# Patient Record
Sex: FEMALE
[De-identification: names, ages, dates, MRNs, and addresses within clinical notes are randomized per-mention and may not be internally consistent; named-entity substitution may affect disease eponyms.]

---

## 2018-05-21 ENCOUNTER — HOSPITAL ENCOUNTER (EMERGENCY)
Dept: HOSPITAL 42 - ED | Age: 73
Discharge: HOME | End: 2018-05-21
Payer: MEDICARE

## 2018-05-21 VITALS — TEMPERATURE: 98.1 F | RESPIRATION RATE: 18 BRPM

## 2018-05-21 VITALS — HEART RATE: 85 BPM | OXYGEN SATURATION: 100 % | DIASTOLIC BLOOD PRESSURE: 82 MMHG | SYSTOLIC BLOOD PRESSURE: 120 MMHG

## 2018-05-21 VITALS — BODY MASS INDEX: 24.1 KG/M2

## 2018-05-21 DIAGNOSIS — M25.562: ICD-10-CM

## 2018-05-21 DIAGNOSIS — Z23: ICD-10-CM

## 2018-05-21 DIAGNOSIS — S80.02XA: Primary | ICD-10-CM
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
## 2018-05-21 DIAGNOSIS — M79.89: ICD-10-CM
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
## 2018-05-21 PROCEDURE — 96372 THER/PROPH/DIAG INJ SC/IM: CPT

## 2018-05-21 PROCEDURE — 90471 IMMUNIZATION ADMIN: CPT

## 2018-05-21 PROCEDURE — 90715 TDAP VACCINE 7 YRS/> IM: CPT

## 2018-05-21 PROCEDURE — 99283 EMERGENCY DEPT VISIT LOW MDM: CPT

## 2018-05-21 PROCEDURE — 73560 X-RAY EXAM OF KNEE 1 OR 2: CPT

## 2018-05-21 NOTE — ED PDOC
Arrival/HPI





- General


Chief Complaint: Lower Extremity Problem/Injury


Time Seen by Provider: 05/21/18 18:37


Historian: Patient





- History of Present Illness


Narrative History of Present Illness (Text): 





05/21/18 18:49


pt p/w + left knee pain s/p trip and fall 1 day ago after she showered, pt 

states she landed on both knees but her left knee pain was much worse today; pt 

was able to stand and ambulate with limping/favoring of left leg; pt denied 

head injury/neck pain, no fever/chills/sweats, no cp/sob/palpitations, no abd 

pain, no n/v, no numbness/tingling, no urinary/bowel changes, no sick contact, 

no travel; pt states her pain is severe, and at home OTC medication (advil) is 

not helping; pt denied other complaints


pt is here for further eval


pt's without other complaints.








PCP: Dr Marin








Time/Duration: 24 hours


Symptom Onset: Sudden


Symptom Course: Unchanged


Quality: Throbbing


Severity Level: Severe


Activities at Onset: Other (ambulation)


Context: Walking, Home





Past Medical History





- Provider Review


Nursing Documentation Reviewed: Yes





- Travel History


Have you recently traveled outside [place] w/in the past 3 mons?: No





- Past History


Past History: No Previous





- Infectious Disease


Hx of Infectious Diseases: None





- Tetanus Immunization


Tetanus Immunization: Unknown





- Reproductive


Menopause: Yes


Currently Pregnant: No





- Cardiac


Hx Cardiac Disorders: No





- Pulmonary


Hx Respiratory Disorders: No





- Neurological


Hx Neurological Disorder: No





- HEENT


Hx HEENT Disorder: Yes (uses glasses)





- Renal


Hx Renal Disorder: No





- Endocrine/Metabolic


Hx Hypothyroidism: Yes





- Hematological/Oncological


Hx Blood Disorders: No





- Integumentary


Hx Dermatological Disorder: No





- Musculoskeletal/Rheumatological


Hx Falls: No





- Gastrointestinal


Hx Gastrointestinal Disorders: No





- Genitourinary/Gynecological


Hx Genitourinary Disorders: No





- Psychiatric


Hx Substance Use: No





- Surgical History


Hx Hysterectomy: Yes





- Suicidal Assessment


Feels Threatened In Home Enviroment: No





Family/Social History





- Physician Review


Nursing Documentation Reviewed: Yes


Family/Social History: No Known Family HX


Smoking Status: Former Smoker


Hx Alcohol Use: No


Hx Substance Use: No


Hx Substance Use Treatment: No





Allergies/Home Meds


Allergies/Adverse Reactions: 


Allergies





acetaminophen Allergy (Verified 05/21/18 19:06)


 HEADACHE








Home Medications: 


 Home Meds











 Medication  Instructions  Recorded  Confirmed


 


Levothyroxine Sodium 0.088 mg PO DAILY 01/08/14 05/21/18














Review of Systems





- Review of Systems


Constitutional: Normal


Eyes: Normal


ENT: Normal


Respiratory: Normal


Cardiovascular: Normal


Gastrointestinal: Normal


Genitourinary Female: Normal


Musculoskeletal: Other (left knee pain/swelling)


Skin: Normal


Neurological: Normal


Endocrine: Normal


Hemo/Lymphatic: Normal


Psychiatric: Normal





Physical Exam





- Physical Exam


Narrative Physical Exam (Text): 





05/21/18 18:51


General: alert/awake, GCS = 15, oriented x 3, sitting on bed, uncomfortable, 

cooperative, interactive; NAD


Head: NC/AT


EYE: PERRLA, EOMI, sclera anicteric, no nystagmus, no photophobia


Facial: WNL


Oral: uvula/tongue are midline, no exudate/lesions, no drooling/stridor, no 

dysphonia; intact dentitions


NECK: intact ROM, no midline tenderness, no nuchal rigidity, no meningeal signs

; no step off


Chest: CTA b/l, no w/r/r; no tachypenia, no accessory muscle use noted


Cardiac: +S1, +S2, no m/r/r, no tachycardia


Abdominal: +BS, soft/nd/nt, well nourished patient; no masses/rebound/guarding/

rigidity; no dumas's sign, no mcburney's point tenderness


Extremities: decr ROM to left knee due to pain/swelling, + left anterior knee 

swelling with tenderness noted on lateral/superior aspect of the left knee, no 

laxity of the knee noted, no gross deformities, no open sores/wounds noted, no 

gross bleeding noted; right knee with anterior ecchymosis noted, intact ROM; 

strength 5/5 grossly intact in all limbs, neurovasc intact b/l; pt is able to 

bear weight but favors left leg; reflex +2/2


BACK: no step off, no midline tenderness, NO crepitus, no gross deformities 

noted; Intact ROM


SKIN: cap refill < 1 sec, no ulcerations, no petechiae, no rashes


NEURO: CNII-XII WNL, no facial asymmetries, no slurr speech, oriented x 3


NIH stroke scale ~ 0


Psych: normal insight, normal affect; follows command with ease











Vital Signs Reviewed: Yes


Vital Signs











  Temp Pulse Resp BP Pulse Ox


 


 05/21/18 18:27  98.1 F  98 H  18  118/78  99











Temperature: Afebrile


Blood Pressure: Normal


Pulse: Regular


Respiratory Rate: Normal


Appearance: Positive for: Well-Appearing, Non-Toxic, Uncomfortable, Other (alert

/awake, cooperative, NAD, GCS = 15, oriented x 3)


Pain Distress: Mild


Mental Status: Positive for: Alert and Oriented X 3





- Systems Exam


Head: Present: Atraumatic, Normocephalic





Medical Decision Making


ED Course and Treatment: 





05/21/18 18:49


Impression: left knee pain, s/p trip and fall


i have consider all the differential diagnosis regarding pt's chief medical 

complaints/clinical findings, including but are not limited to: left knee pain, 

s/p trip and fall





A/P: left knee pain, s/p trip and fall


- xray


- RICE txt


- crutches


- supportive care


- observe/reevaluation





1930


pt is awaiting xray results





2000


pt felt some improvement





pt/family are made aware of pt's medical results


pt is recommended for RICE txt


pt is recommended to wear knee immobilizer


pt is instructed on min weight bearing


pt will f/u as directed


pt will be discharge home











Re-evaluation Time: 20:25


Reassessment Condition: Improving,but remains with symptoms





- RAD Interpretation


Narrative RAD Interpretations (Text): 





05/21/18 20:10


 djd/osteopenia


no gross fx noted; no dislocation noted


+ left lateral hyperdense structre noted


+ left femur dense structure noted, ? bony cyst








Radiology Orders: 








05/21/18 19:07


KNEE LEFT 2 VIEWS (AP & LAT) [RAD] Stat 











: ED Physician





- Medication Orders


Current Medication Orders: 











Discontinued Medications





Ketorolac Tromethamine (Toradol)  15 mg IM STAT STA


   Stop: 05/21/18 19:23


   Last Admin: 05/21/18 19:57  Dose: 15 mg





MAR Pain Assessment


 Document     05/21/18 19:57  AD  (Rec: 05/21/18 19:57  AD  [facility] [email or web address])


     Pain Reassessment


      Is this a pain reassessment?               No


     Presence of Pain


      Presence of Pain                           Yes


     Location


      Left, Right or Bilateral                   Left


      Pain Location Body Site                    Knee


IM Administration Charges


 Document     05/21/18 19:57  AD  (Rec: 05/21/18 19:57  AD  [email or web address]


     Injection Site


      MAR Injection Site                         Right Deltoid


     Charges for Administration


      # of IM Administrations                    1





Oxycodone HCl (Oxycodone Immediate Release Tab)  5 mg PO STAT STA


   Stop: 05/21/18 19:23


   Last Admin: 05/21/18 19:56  Dose: 5 mg





MAR Pain Assessment


 Document     05/21/18 19:56  AD  (Rec: 05/21/18 19:56  AD  [email or web address]


     Pain Reassessment


      Is this a pain reassessment?               No


     Presence of Pain


      Presence of Pain                           Yes


     Location


      Left, Right or Bilateral                   Left


      Pain Location Body Site                    Knee





Tetanus/Reduced Diphtheria/Acell Pertussis (Boostrix Vaccine Inj)  0.5 ml IM 

.ONCE ONE


   Stop: 05/21/18 19:08


   Last Admin: 05/21/18 19:18  Dose: 0.5 ml





Immunization Registry


 Document     05/21/18 19:18  AD  (Rec: 05/21/18 19:18  AD  [email or web address]


      Immunization Registry Consent Date         05/21/18














Disposition/Present on Arrival





- Present on Arrival


Any Indicators Present on Arrival: No


History of DVT/PE: No


History of Uncontrolled Diabetes: No


Urinary Catheter: No


History of Decub. Ulcer: No


History Surgical Site Infection Following: None





- Disposition


Have Diagnosis and Disposition been Completed?: Yes


Diagnosis: 


 Knee pain, left, Knee swelling, Fall, Knee contusion





Disposition: HOME/ ROUTINE


Disposition Time: 20:10


Patient Plan: Discharge


Patient Problems: 


 Current Active Problems











Problem Status Onset


 


Knee pain, left Acute  


 


Knee swelling Acute  


 


Fall Acute  











Condition: STABLE


Discharge Instructions (ExitCare):  Preventing Falls in the Older Adult, Knee 

Pain (DC)


Print Language: ENGLISH


Additional Instructions: 


Make sure to see your doctor in 1-2 days


DRINK PLENTY OF FLUIDS


take your medications as prescribed


REST your knee/leg


ice your knee/leg 15min/hr over the next 1-2 days


use crutches for ambulation


RETURN TO ED IF worse pain, cant breath, persistent vomiting, high fever >101-

102 for hours, altered behavior, slurr speech, facial changes, focal weakness (

arm/leg or both), unable to urinate, heavy/persistent bleeding, passing out, 

chest pain, or other medical emergencies


Prescriptions: 


Ibuprofen [Motrin] 400 mg PO QID PRN #30 tab


 PRN Reason: Pain, Mild (1-3)


oxyCODONE [oxyCODONE Immediate Release Tab] 5 mg PO TID PRN #10 tab


 PRN Reason: Pain, Moderate (4-7)


Referrals: 


Steven Marin MD, PhD [Primary Care Provider] - Follow up with primary


Jessica Willoughby MD [Staff Provider] - Follow up with primary


Forms:  [employer] (English)

## 2018-05-22 NOTE — RAD
PROCEDURE:  Left Knee Radiographs.



HISTORY:

Pain.



COMPARISON:

None.



FINDINGS:



BONES:

. No fracture. Bone island suggested lateral tibial metaphysis 



JOINTS:

Tricompartmental osteoarthrosis mild medial lateral femoral tibial 

compartments. Moderate patellofemoral compartment 



JOINT EFFUSION:

Present 



OTHER FINDINGS:

Nadeemella suggested -developmental variant



Well corticated ossifications border the posterior superior patellar 

aspect nonspecific - comparison with prior studies outside if 

available recommended;  soft tissue ossification versus small loose 

bodies-considerations. These appear  more posteriorly positioned to 

an  expected quadriceps insertional enthesophyte site



IMPRESSION:

Osteoarthrosis -patellofemoral compartment most notably affected.  No 

fracture or lytic lesion 



Moderate joint effusion



Tibial benign-appearing bone island

## 2018-07-12 ENCOUNTER — HOSPITAL ENCOUNTER (EMERGENCY)
Dept: HOSPITAL 42 - ED | Age: 73
Discharge: HOME | End: 2018-07-12
Payer: MEDICARE

## 2018-07-12 VITALS
TEMPERATURE: 98.2 F | DIASTOLIC BLOOD PRESSURE: 68 MMHG | OXYGEN SATURATION: 99 % | HEART RATE: 72 BPM | RESPIRATION RATE: 18 BRPM | SYSTOLIC BLOOD PRESSURE: 147 MMHG

## 2018-07-12 VITALS — BODY MASS INDEX: 21.7 KG/M2

## 2018-07-12 DIAGNOSIS — R13.10: Primary | ICD-10-CM

## 2018-07-12 LAB
ALBUMIN SERPL-MCNC: 4 G/DL (ref 3–4.8)
ALBUMIN/GLOB SERPL: 1.1 {RATIO} (ref 1.1–1.8)
ALT SERPL-CCNC: 30 U/L (ref 7–56)
AST SERPL-CCNC: 23 U/L (ref 14–36)
BASOPHILS # BLD AUTO: 0 K/MM3 (ref 0–2)
BASOPHILS NFR BLD: 0 % (ref 0–3)
BUN SERPL-MCNC: 13 MG/DL (ref 7–21)
CALCIUM SERPL-MCNC: 9.5 MG/DL (ref 8.4–10.5)
EOSINOPHIL # BLD: 0 10*3/UL (ref 0–0.7)
EOSINOPHIL NFR BLD: 0.5 % (ref 1.5–5)
ERYTHROCYTE [DISTWIDTH] IN BLOOD BY AUTOMATED COUNT: 13.8 % (ref 11.5–14.5)
GFR NON-AFRICAN AMERICAN: > 60
GRANULOCYTES # BLD: 4.44 10*3/UL (ref 1.4–6.5)
GRANULOCYTES NFR BLD: 79.1 % (ref 50–68)
HGB BLD-MCNC: 11.1 G/DL (ref 12–16)
LYMPHOCYTES # BLD: 0.7 10*3/UL (ref 1.2–3.4)
LYMPHOCYTES NFR BLD AUTO: 11.9 % (ref 22–35)
MCH RBC QN AUTO: 27.5 PG (ref 25–35)
MCHC RBC AUTO-ENTMCNC: 33.1 G/DL (ref 31–37)
MCV RBC AUTO: 83.1 FL (ref 80–105)
MONOCYTES # BLD AUTO: 0.5 10*3/UL (ref 0.1–0.6)
MONOCYTES NFR BLD: 8.5 % (ref 1–6)
PLATELET # BLD: 526 10^3/UL (ref 120–450)
PMV BLD AUTO: 9.3 FL (ref 7–11)
RBC # BLD AUTO: 4.03 10^6/UL (ref 3.5–6.1)
WBC # BLD AUTO: 5.6 10^3/UL (ref 4.5–11)

## 2018-07-12 PROCEDURE — 80053 COMPREHEN METABOLIC PANEL: CPT

## 2018-07-12 PROCEDURE — 70491 CT SOFT TISSUE NECK W/DYE: CPT

## 2018-07-12 PROCEDURE — 99284 EMERGENCY DEPT VISIT MOD MDM: CPT

## 2018-07-12 PROCEDURE — 85025 COMPLETE CBC W/AUTO DIFF WBC: CPT

## 2018-07-12 NOTE — ED PDOC
Arrival/HPI





- General


Chief Complaint: ENT Problem


Time Seen by Provider: 07/12/18 16:41


Historian: Patient





- History of Present Illness


Narrative History of Present Illness (Text): 





07/12/18 16:45





73 year old female, with past medical history of hypothyroidism and hysterectomy

, presents to the Emergency department complaining of difficulty swallowing 

since 1 week. Patient states worsening difficulty of swallowing solid foods, 

stating food takes a long time to go down. Patient denies similar symptoms in 

the past and informs no complaint in swallowing liquids. Despite the presented 

complaints, patient states compliance with diet as her last intake was chinese 

fried rice last night. Patient informs associated nausea but denies any fever, 

chills, vomiting, diarrhea, abdominal pain, chest pain, shortness of breath or 

any other complaints. Patient presents to the Emergency department for medical 

evaluation. 





PMD: Dr Marin





Time/Duration: 1 week


Symptom Onset: Gradual


Symptom Course: Worsening


Activities at Onset: Eating


Context: Home





Past Medical History





- Provider Review


Nursing Documentation Reviewed: Yes





- Past History


Past History: No Previous





- Infectious Disease


Hx of Infectious Diseases: None





- Tetanus Immunization


Tetanus Immunization: Unknown





- Cardiac


Hx Cardiac Disorders: No





- Pulmonary


Hx Respiratory Disorders: No





- Neurological


Hx Neurological Disorder: No





- HEENT


Hx HEENT Disorder: Yes (uses glasses)





- Renal


Hx Renal Disorder: No





- Endocrine/Metabolic


Hx Hypothyroidism: Yes





- Hematological/Oncological


Hx Blood Disorders: No





- Integumentary


Hx Dermatological Disorder: No





- Musculoskeletal/Rheumatological


Hx Falls: No





- Gastrointestinal


Hx Gastrointestinal Disorders: No





- Genitourinary/Gynecological


Hx Genitourinary Disorders: No





- Psychiatric


Hx Psychophysiologic Disorder: No


Hx Substance Use: No





- Surgical History


Hx Hysterectomy: Yes





- Suicidal Assessment


Feels Threatened In Home Enviroment: No





Family/Social History





- Physician Review


Nursing Documentation Reviewed: Yes


Family/Social History: No Known Family HX


Smoking Status: Former Smoker


Hx Alcohol Use: No


Hx Substance Use: No


Hx Substance Use Treatment: No





Allergies/Home Meds


Allergies/Adverse Reactions: 


Allergies





acetaminophen Allergy (Verified 07/12/18 16:38)


 HEADACHE








Home Medications: 


 Home Meds











 Medication  Instructions  Recorded  Confirmed


 


Levothyroxine Sodium 0.088 mg PO DAILY 01/08/14 07/12/18














Review of Systems





- Physician Review


All systems were reviewed & negative as marked: Yes





- Review of Systems


Constitutional: absent: Fevers


ENT: Other (Difficulty swallowing)


Respiratory: absent: SOB


Cardiovascular: absent: Chest Pain


Gastrointestinal: Nausea.  absent: Abdominal Pain, Diarrhea, Vomiting





Physical Exam





- Physical Exam


Narrative Physical Exam (Text): 





07/12/18 16:57


Gen:  VS reviewed, alert, well developed, well nourished, nontoxic, mild 

distress


ENT:  normal pharynx


Eye: EOMI, PERRL 


Neck: no JVD, supple, no adenopathy


CV: regular rate, regular rhythm, no rubs,no murmur, no gallops, S1, S2, pulses 

equal and strong


Pulm: no distress, clear to auscultation, no wheeze, no rhonchi, breath sounds 

equal, no rales


Abd: soft, nontender, no guarding, no rebound, no rigidity, normal bowel sounds


Ext: no edema


Skin: good color, no rash, no cyanosis


Psych: responds appropriately to questions, normal affect


Neuro: oriented x3, CN2-12 intact grossly, motor intact, sensation intact 


Vital Signs Reviewed: Yes


Vital Signs











  Temp Pulse Resp BP Pulse Ox


 


 07/12/18 20:39  98.2 F  72  18  147/68  99


 


 07/12/18 20:00  98.2 F  72  18  147/68  99


 


 07/12/18 16:39  98.3 F  85  17  152/73 H  98











Temperature: Afebrile


Blood Pressure: Hypertensive


Pulse: Regular


Respiratory Rate: Normal


Appearance: Positive for: Well-Appearing, Non-Toxic, Comfortable


Pain Distress: None


Mental Status: Positive for: Alert and Oriented X 3





Medical Decision Making


ED Course and Treatment: 





07/12/18 16:57


Impression: 73 year old female presents to the Emergency department for 

difficulty swallowing. 





Plan:


-- CT of Neck soft tissue


-- Labs


-- Reassess and disposition





Prior Visits:


Notes and results from previous visits were reviewed. 





Progress Notes:





07/12/18 18:53


Case endorsed to Dr. Cerrato, the night doctor, for pending Ct result, 

reassessment and disposition.  








- Lab Interpretations


Lab Results: 








 07/12/18 17:50 





 07/12/18 17:50 





 Lab Results





07/12/18 17:50: Sodium 145, Potassium 4.1, Chloride 104, Carbon Dioxide 29, 

Anion Gap 16, BUN 13, Creatinine 0.6 L, Est GFR ( Amer) > 60, Est GFR (

Non-Af Amer) > 60, Random Glucose 87, Calcium 9.5, Total Bilirubin 0.6, AST 23, 

ALT 30, Alkaline Phosphatase 88, Total Protein 7.5, Albumin 4.0, Globulin 3.5, 

Albumin/Globulin Ratio 1.1


07/12/18 17:50: WBC 5.6, RBC 4.03, Hgb 11.1 L, Hct 33.5 L, MCV 83.1, MCH 27.5, 

MCHC 33.1, RDW 13.8, Plt Count 526 H, MPV 9.3, Gran % 79.1 H, Lymph % (Auto) 

11.9 L, Mono % (Auto) 8.5 H, Eos % (Auto) 0.5 L, Baso % (Auto) 0.0, Gran # 4.44

, Lymph # (Auto) 0.7 L, Mono # (Auto) 0.5, Eos # (Auto) 0.0, Baso # (Auto) 0.00











- RAD Interpretation


Radiology Orders: 








07/12/18 17:00


NECK SOFT TISSUE W/CONTRAST [CT] Stat 














- Medication Orders


Current Medication Orders: 











Discontinued Medications





Sodium Chloride (Sodium Chloride 0.9%)  1,000 mls @ 150 mls/hr IV .[id number] KENNA


   Last Admin: 07/12/18 18:01  Dose: 150 mls/hr





eMAR Start Stop


 Document     07/12/18 18:01  HI  (Rec: 07/12/18 18:01  [email or web address]


     Intravenous Solution


      Start Date                                 07/12/18


      Start Time                                 18:01














- Scribe Statement


The provider has reviewed the documentation as recorded by the Kavithaibvidhya Jordan.


 





All medical record entries made by the Kavithaibvidhya were at my direction and 

personally dictated by me. I have reviewed the chart and agree that the record 

accurately reflects my personal performance of the history, physical exam, 

medical decision making, and the department course for this patient. I have 

also personally directed, reviewed, and agree with the discharge instructions 

and disposition.





Disposition/Present on Arrival





- Present on Arrival


Any Indicators Present on Arrival: No


History of DVT/PE: No


History of Uncontrolled Diabetes: No


Urinary Catheter: No


History of Decub. Ulcer: No


History Surgical Site Infection Following: None





- Disposition


Have Diagnosis and Disposition been Completed?: Yes


Diagnosis: 


 Dysphagia





Disposition: HOME/ ROUTINE


Disposition Time: 10:18


Condition: GOOD


Discharge Instructions (ExitCare):  Dysphagia


Additional Instructions: 





MINI GARCIA, thank you for letting us take care of you today. Your 

provider was Tran Cerrato MD and you were treated for THROAT. The 

emergency medical care you received today was directed at your acute symptoms. 

If you were prescribed any medication, please fill it and take as directed. It 

may take several days for your symptoms to resolve. Return to the Emergency 

Department if your symptoms worsen, do not improve, or if you have any other 

problems.





Please contact your doctor or call one of the physicians/clinics you have been 

referred to that are listed on the Patient Visit Information form that is 

included in your discharge packet. Bring any paperwork you were given at 

discharge with you along with any medications you are taking to your follow up 

visit. Our treatment cannot replace ongoing medical care by a primary care 

provider outside of the emergency department.





Thank you for allowing the [facility] team to be part of your care today.








If you had an X-Ray or CT scan: A Radiologist will review the ED reading if any 

change in treatment is needed we will contact you.***





If you had a blood, urine, or wound culture: It will take several days for the 

results, if any change in treatment is needed we will contact you.***





If you had an STI test: It will take 48 hours for the results. Please call 

after 1 week if you have not heard back.***


Referrals: 


Steven Marin MD, PhD [Primary Care Provider] - Follow up with primary


Forms:  [employer] (English)

## 2018-07-12 NOTE — ED PDOC
Physical Exam


Vital Signs Reviewed: Yes


Vital Signs











  Temp Pulse Resp BP Pulse Ox


 


 18 16:39  98.3 F  85  17  152/73 H  98











Temperature: Afebrile


Blood Pressure: Normal


Pulse: Regular


Respiratory Rate: Normal


Appearance: Positive for: Well-Appearing, Non-Toxic, Comfortable


Pain Distress: None


Mental Status: Positive for: Alert and Oriented X 3





- Systems Exam


Head: Present: Atraumatic, Normocephalic


Pupils: Present: PERRL


Extroacular Muscles: Present: EOMI


Conjunctiva: Present: Normal


Mouth: Present: Moist Mucous Membranes


Neck: Present: Normal Range of Motion


Respiratory/Chest: Present: Clear to Auscultation, Good Air Exchange.  No: 

Respiratory Distress, Accessory Muscle Use


Cardiovascular: Present: Regular Rate and Rhythm, Normal S1, S2.  No: Murmurs


Abdomen: No: Tenderness, Distention, Peritoneal Signs


Back: Present: Normal Inspection


Upper Extremity: Present: Normal Inspection.  No: Cyanosis, Edema


Lower Extremity: Present: Normal Inspection.  No: Edema


Neurological: Present: GCS=15, CN II-XII Intact, Speech Normal


Skin: Present: Warm, Dry, Normal Color.  No: Rashes


Psychiatric: Present: Alert, Oriented x 3, Normal Insight, Normal Concentration





Medical Decision Making


ED Course and Treatment: 





18 19:03


Case endorsed to me by Dr. Buenrostro for pending CT results, reassessment and 

final disposition. Patient is a 73 year old female with past medical history of 

hypothyroidism and hysterectomy, presented to the Emergency department earlier 

today complaining of difficulty swallowing since 1 week. Patient is currently 

resting in bed in no acute distress. Patient denies any new complaints. 





18 20:15


Patient re-evaluated. She is in no acute distress. Tolerating secretions, no 

respiratory issues. CT results discussed with patient. Discussed admission for 

GI consult vs discharge with outpatient followup. Patient states that she does 

not want to be admitted at this time. Recommended following up with her PMD for 

specialist referral. Patient is amenable with this plan. She was advised to 

return to the ED at any time for worsening symptoms- respiratory distress, 

trouble tolerating secretions, drooling, etc. She verbalizes understanding.


Reassessment Condition: Unchanged





- Lab Interpretations


Lab Results: 








 18 17:50 





 18 17:50 





 Lab Results





18 17:50: Sodium 145, Potassium 4.1, Chloride 104, Carbon Dioxide 29, 

Anion Gap 16, BUN 13, Creatinine 0.6 L, Est GFR ( Amer) > 60, Est GFR (

Non-Af Amer) > 60, Random Glucose 87, Calcium 9.5, Total Bilirubin 0.6, AST 23, 

ALT 30, Alkaline Phosphatase 88, Total Protein 7.5, Albumin 4.0, Globulin 3.5, 

Albumin/Globulin Ratio 1.1


18 17:50: WBC 5.6, RBC 4.03, Hgb 11.1 L, Hct 33.5 L, MCV 83.1, MCH 27.5, 

MCHC 33.1, RDW 13.8, Plt Count 526 H, MPV 9.3, Gran % 79.1 H, Lymph % (Auto) 

11.9 L, Mono % (Auto) 8.5 H, Eos % (Auto) 0.5 L, Baso % (Auto) 0.0, Gran # 4.44

, Lymph # (Auto) 0.7 L, Mono # (Auto) 0.5, Eos # (Auto) 0.0, Baso # (Auto) 0.00











- RAD Interpretation


Narrative RAD Interpretations (Text): 





Patient Name: MINI GARCIA


Institution Name: [place] [address]


Study Type: CT NECK SOFT TISSUE W


Ordered As: CT NECK SOFT TISSUE W CONTRAST


Date of Dictation: 2018 EDT Accession: [id number]


Date of Exam: 2018 EDT Account Number:


Patient ID: [id number] Patient : 1945


Patient Location: ED Caretaker:


Account #: Referring Physician: Bethel BUENROSTRO


This interpretation is based upon the receipt of 300 images.





EXAM:


CT Neck With Intravenous Contrast





CLINICAL HISTORY:


73 years old, female; Signs and symptoms; Dysphagia / difficulty swallowing; 

Prior surgery; Surgery


date: 6+ months; Surgery type: HX goiter surgery; Patient HX: HX hypothyroidism





TECHNIQUE:


Axial computed tomography images of the neck with intravenous contrast. All CT 

scans at this


facility use at least one of these dose optimization techniques: automated 

exposure control; mA


and/or kV adjustment per patient size (includes targeted exams where dose is 

matched to clinical


indication); or iterative reconstruction.





CONTRAST:


100 mL of omni 350 administered intravenously.





COMPARISON:


No relevant prior studies available.





FINDINGS:


Oropharynx: Unremarkable. No tonsillar or peritonsillar abscess.


Hypopharynx: Unremarkable.


Larynx: Unremarkable. Normal epiglottis.


Trachea: Unremarkable.


Retropharyngeal space: Unremarkable.


Submandibular/parotid glands: Unremarkable.


Thyroid: 8 mm ovoid low density right thyroid lobe nodule.


Bones/joints: Degenerative changes in the cervical spine most notably at C5-C6 

and C6-C7. Mild


approximately 10% anterolisthesis C4 on C5, likely chronic.


Soft tissues: Unremarkable.


Vasculature: No acute findings.


Lymph nodes: Unremarkable.


Sinuses: Right maxillary sinus mucus retention cyst or polyp. Subcentimeter 

left posterior ethmoid


sinus mucous retention cyst, polyp or polypoid like mucosal thickening. No 

fluid levels.


Dental: Edentulous.


Lung apices: Mild hazy density in upper lobes probably atelectasis.





IMPRESSION:


8mm right thyroid lobe nodule. ACR White Paper guidelines (Chintan JK, et al. JACR


2015;12(2):143-50) suggest that no follow-up is necessary.


Thank you for allowing us to participate in the care of your patient.


Dictated and Authenticated by: Dl Marcus MD


2018 7:42 PM Eastern Time ([place] & [place])


Radiology Orders: 








18 17:00


NECK SOFT TISSUE W/CONTRAST [CT] Stat 











: Radiologist





- Medication Orders


Current Medication Orders: 








Sodium Chloride (Sodium Chloride 0.9%)  1,000 mls @ 150 mls/hr IV .[id number] KENNA


   Last Admin: 18 18:01  Dose: 150 mls/hr





eMAR Start Stop


 Document     18 18:01  HI  (Rec: 18 18:01  [id number])


     Intravenous Solution


      Start Date                                 18


      Start Time                                 18:01














- Scribe Statement


The provider has reviewed the documentation as recorded by the Kavithaibvidhya Jordan.





All medical record entries made by the Kavithaibvidhya were at my direction and 

personally dictated by me. I have reviewed the chart and agree that the record 

accurately reflects my personal performance of the history, physical exam, 

medical decision making, and the department course for this patient. I have 

also personally directed, reviewed, and agree with the discharge instructions 

and disposition.





Disposition/Present on Arrival





- Present on Arrival


Any Indicators Present on Arrival: No


History of DVT/PE: No


History of Uncontrolled Diabetes: No


Urinary Catheter: No


History of Decub. Ulcer: No


History Surgical Site Infection Following: None





- Disposition


Have Diagnosis and Disposition been Completed?: Yes


Diagnosis: 


 Dysphagia





Disposition: HOME/ ROUTINE


Disposition Time: 20:15


Patient Problems: 


 Current Active Problems











Problem Status Onset


 


Dysphagia Acute  











Condition: GOOD


Discharge Instructions (ExitCare):  Dysphagia


Additional Instructions: 





MINI GARCIA, thank you for letting us take care of you today. Your 

provider was Tran Cerrato MD and you were treated for THROAT. The 

emergency medical care you received today was directed at your acute symptoms. 

If you were prescribed any medication, please fill it and take as directed. It 

may take several days for your symptoms to resolve. Return to the Emergency 

Department if your symptoms worsen, do not improve, or if you have any other 

problems.





Please contact your doctor or call one of the physicians/clinics you have been 

referred to that are listed on the Patient Visit Information form that is 

included in your discharge packet. Bring any paperwork you were given at 

discharge with you along with any medications you are taking to your follow up 

visit. Our treatment cannot replace ongoing medical care by a primary care 

provider outside of the emergency department.





Thank you for allowing the [facility] team to be part of your care today.








If you had an X-Ray or CT scan: A Radiologist will review the ED reading if any 

change in treatment is needed we will contact you.***





If you had a blood, urine, or wound culture: It will take several days for the 

results, if any change in treatment is needed we will contact you.***





If you had an STI test: It will take 48 hours for the results. Please call 

after 1 week if you have not heard back.***


Referrals: 


Steven Marin MD, PhD [Primary Care Provider] - Follow up with primary


Forms:  [employer] (English)

## 2018-07-13 NOTE — CT
Date of service: 



07/12/2018



PROCEDURE:  CT NECK WITH  CONTRAST



HISTORY:

dysphagia



COMPARISON:

None



TECHNIQUE:

CT of the neck with intravenous contrast. Coronal and sagittal 

reformats generated.



Intravenous contrast dose: 



Radiation dose: DLP  mGy-cm



This CT exam was performed using one or more of the following dose 

reduction techniques: Automated exposure control, adjustment of the 

mA and/or kV according to patient size, and/or use of iterative 

reconstruction technique.



FINDINGS:



NASOPHARYNX:

Unremarkable.



SUPRAHYOID NECK:

Unremarkable oropharynx, oral cavity, parapharyngeal space and 

retropharyngeal space.



INFRAHYOID NECK:

Unremarkable larynx, hypopharynx, and supraglottic space. Vocal cords 

intact.



MASS:

None.



GLANDS:

Parotid and submandibular glands unremarkable. Normal size thyroid 

gland, with 8mm right thyroid nodule.



LYMPH NODES:

Normal. No lymphadenopathy.



CERVICAL SPINE:

No fracture or focal lesion. 



VASCULAR STRUCTURES:

Unremarkable.



OTHER FINDINGS:

None.



IMPRESSION:

8 mm right thyroid nodule.